# Patient Record
Sex: MALE | Race: WHITE | NOT HISPANIC OR LATINO | Employment: PART TIME | URBAN - METROPOLITAN AREA
[De-identification: names, ages, dates, MRNs, and addresses within clinical notes are randomized per-mention and may not be internally consistent; named-entity substitution may affect disease eponyms.]

---

## 2024-04-12 ENCOUNTER — APPOINTMENT (OUTPATIENT)
Dept: ORTHOPEDIC SURGERY | Facility: CLINIC | Age: 29
End: 2024-04-12
Payer: MEDICAID

## 2024-04-16 ENCOUNTER — HOSPITAL ENCOUNTER (OUTPATIENT)
Dept: RADIOLOGY | Facility: CLINIC | Age: 29
Discharge: HOME | End: 2024-04-16
Payer: MEDICAID

## 2024-04-16 ENCOUNTER — OFFICE VISIT (OUTPATIENT)
Dept: ORTHOPEDIC SURGERY | Facility: CLINIC | Age: 29
End: 2024-04-16
Payer: MEDICAID

## 2024-04-16 VITALS — HEIGHT: 75 IN | BODY MASS INDEX: 23 KG/M2 | WEIGHT: 185 LBS

## 2024-04-16 DIAGNOSIS — M79.641 HAND PAIN, RIGHT: ICD-10-CM

## 2024-04-16 PROCEDURE — 1036F TOBACCO NON-USER: CPT | Performed by: ORTHOPAEDIC SURGERY

## 2024-04-16 PROCEDURE — 73130 X-RAY EXAM OF HAND: CPT | Mod: RT

## 2024-04-16 PROCEDURE — 99203 OFFICE O/P NEW LOW 30 MIN: CPT | Performed by: ORTHOPAEDIC SURGERY

## 2024-04-16 PROCEDURE — 73130 X-RAY EXAM OF HAND: CPT | Mod: RIGHT SIDE | Performed by: RADIOLOGY

## 2024-04-16 NOTE — PROGRESS NOTES
History of Present Illness:  Chief Complaint   Patient presents with    Right Hand - Pain     28-year-old male presents for evaluation of chronic right hand pain following right fifth metacarpal fracture approximately 6 years ago.  He has had 2 previous injuries to his right hand including fourth and fifth metacarpal shaft fractures that were treated with operative fixation and subsequent pin removal.  6 years ago he sustained a fracture to the fifth metacarpal head/neck region with significant apex dorsal angulation and elected for nonoperative treatment at that time.  He does report some continued soreness about the fifth metacarpal head/MCP region.  He also describes some degree of weakness compared to prior to his injury.  He has been able to continue with high-level functioning, including as his job as a  without specific issue.    Past Medical History:   Diagnosis Date    Pain in joints of unspecified hand     Pain in joint, hand       Medication Documentation Review Audit       Reviewed by Kareen Flood CMA (Medical Assistant) on 04/16/24 at 1121      Medication Order Taking? Sig Documenting Provider Last Dose Status            No Medications to Display                                   No Known Allergies    Social History     Socioeconomic History    Marital status: Single     Spouse name: Not on file    Number of children: Not on file    Years of education: Not on file    Highest education level: Not on file   Occupational History    Not on file   Tobacco Use    Smoking status: Never    Smokeless tobacco: Never   Substance and Sexual Activity    Alcohol use: Not Currently    Drug use: Never    Sexual activity: Defer   Other Topics Concern    Not on file   Social History Narrative    Not on file     Social Determinants of Health     Financial Resource Strain: Medium Risk (9/12/2022)    Received from [facility]    Overall Financial Resource Strain (CARDIA)     Difficulty of Paying Living  Expenses: Somewhat hard   Food Insecurity: No Food Insecurity (9/12/2022)    Received from [facility]    Hunger Vital Sign     Worried About Running Out of Food in the Last Year: Never true     Ran Out of Food in the Last Year: Never true   Transportation Needs: No Transportation Needs (9/12/2022)    Received from [facility]    PRAPARE - Transportation     Lack of Transportation (Medical): No     Lack of Transportation (Non-Medical): No   Physical Activity: Sufficiently Active (9/12/2022)    Received from [facility]    Exercise Vital Sign     Days of Exercise per Week: 5 days     Minutes of Exercise per Session: 90 min   Stress: No Stress Concern Present (9/12/2022)    Received from [facility]    [employer] [place] [employer] - Occupational Stress Questionnaire     Feeling of Stress : Only a little   Social Connections: Unknown (9/12/2022)    Received from [facility]    Social Connection and Isolation Panel [NHANES]     Frequency of Communication with Friends and Family: More than three times a week     Frequency of Social Gatherings with Friends and Family: Three times a week     Attends Anabaptism Services: Never     Active Member of Clubs or Organizations: No     Attends Club or Organization Meetings: Never     Marital Status: Patient declined   Intimate Partner Violence: Not on file   Housing Stability: Low Risk  (9/12/2022)    Received from [facility]    Housing Stability Vital Sign     Unable to Pay for Housing in the Last Year: No     Number of Places Lived in the Last Year: 1     Unstable Housing in the Last Year: No       Past Surgical History:   Procedure Laterality Date    OTHER SURGICAL HISTORY  06/24/2013    Treatment Of Fracture Of The Hand        Review of Systems   GENERAL: Negative for malaise, significant weight loss, fever  MUSCULOSKELETAL: see HPI  NEURO:  Negative     Physical Examination  Constitutional: Appears well-developed and  well-nourished.  Head: Normocephalic and atraumatic.  Eyes: EOMI grossly  Cardiovascular: Intact distal pulses.   Respiratory: Effort normal. No respiratory distress.  Neurologic: Alert and oriented to person, place, and time.  Skin: Skin is warm and dry.  Hematologic / Lymphatic: No lymphedema, lymphangitis.  Psychiatric: normal mood and affect. Behavior is normal.   Musculoskeletal:  Right hand: Visible and palpable deformity about fifth metacarpal neck region.  0 cm DPC.  Able to fully extend all digits.  No scissoring/malrotation.  Capillary refill less than 2 seconds all digits.  Sensation intact.    Radiographs: History of fall right hand radiographs available for review in addition to new right hand radiographs ordered and available for my review/interpretation: Well-healed fifth metacarpal neck fracture with apex dorsal angulation and some volar translation of the metacarpal head.     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     Plan:  Nature of the diagnosis was discussed with the patient.  Given the chronicity of this injury as well as relatively good function of the right hand I would not recommend any surgical intervention to attempt to correct the angular deformity of the fifth metacarpal.  Discussed with patient that there is a high risk of continued symptoms as well as stiffness in the MCP joint given the chronicity of this fracture deformity.  We also discussed potential risks associated with surgery including non/delayed healing.  Patient was provided with some of my colleagues information if he wishes to pursue additional opinions.  Questions addressed.